# Patient Record
Sex: MALE | URBAN - METROPOLITAN AREA
[De-identification: names, ages, dates, MRNs, and addresses within clinical notes are randomized per-mention and may not be internally consistent; named-entity substitution may affect disease eponyms.]

---

## 2022-06-22 ENCOUNTER — TELEPHONE (OUTPATIENT)
Dept: CARDIOLOGY CLINIC | Age: 22
End: 2022-06-22

## 2022-06-22 NOTE — TELEPHONE ENCOUNTER
Niesha Cole sent a referral for [employer]. Dr. Julián Rees reviewed the referral and advised to order a treadmill stress test and a holter monitor. I called patient to schedule an appointment and testing. [employer] stated that he was wearing a 48 hr Holter monitor that Margarita had ordered and he wanted to wait until he had the results of the monitor and he would call back to schedule an appointment if needed.